# Patient Record
Sex: MALE | Race: ASIAN | NOT HISPANIC OR LATINO | Employment: FULL TIME | URBAN - METROPOLITAN AREA
[De-identification: names, ages, dates, MRNs, and addresses within clinical notes are randomized per-mention and may not be internally consistent; named-entity substitution may affect disease eponyms.]

---

## 2024-07-05 ENCOUNTER — HOSPITAL ENCOUNTER (EMERGENCY)
Facility: HOSPITAL | Age: 29
Discharge: HOME | End: 2024-07-05
Attending: EMERGENCY MEDICINE | Admitting: EMERGENCY MEDICINE
Payer: OTHER MISCELLANEOUS

## 2024-07-05 VITALS
RESPIRATION RATE: 20 BRPM | DIASTOLIC BLOOD PRESSURE: 83 MMHG | OXYGEN SATURATION: 97 % | TEMPERATURE: 98 F | SYSTOLIC BLOOD PRESSURE: 133 MMHG | BODY MASS INDEX: 24.8 KG/M2 | HEIGHT: 67 IN | HEART RATE: 67 BPM | WEIGHT: 158 LBS

## 2024-07-05 DIAGNOSIS — S61.219A FINGER LACERATION: Primary | ICD-10-CM

## 2024-07-05 PROCEDURE — 99284 EMERGENCY DEPT VISIT MOD MDM: CPT | Performed by: EMERGENCY MEDICINE

## 2024-07-05 PROCEDURE — 12001 RPR S/N/AX/GEN/TRNK 2.5CM/<: CPT | Performed by: EMERGENCY MEDICINE

## 2024-07-05 RX ORDER — LIDOCAINE HYDROCHLORIDE 10 MG/ML
10 INJECTION, SOLUTION EPIDURAL; INFILTRATION; INTRACAUDAL; PERINEURAL ONCE
Status: COMPLETED | OUTPATIENT
Start: 2024-07-05 | End: 2024-07-05

## 2024-07-05 RX ADMIN — LIDOCAINE HYDROCHLORIDE 10 ML: 10 INJECTION, SOLUTION EPIDURAL; INFILTRATION; INTRACAUDAL; PERINEURAL at 15:05

## 2024-07-05 NOTE — ED PROVIDER NOTES
History  Chief Complaint   Patient presents with    Finger Injury     Per pt he was at work and he cut his middle finger on right hand.      29-year-old male presents emergency department today for evaluation of cut he sustained at work.  Patient was cutting a piece of sushi with a  knife when the knife slipped and went into his right middle finger, cutting him.  He noted immediate bleeding but washed out thoroughly and wrapped it with a bandage and came to the hospital for an evaluation.  He tells him that he can still feel and move the finger with no problems.        None       History reviewed. No pertinent past medical history.    History reviewed. No pertinent surgical history.    History reviewed. No pertinent family history.  I have reviewed and agree with the history as documented.    E-Cigarette/Vaping    E-Cigarette Use Never User      E-Cigarette/Vaping Substances    Nicotine No     THC No     CBD No     Flavoring No     Unknown No      Social History     Tobacco Use    Smoking status: Every Day     Types: Cigarettes    Smokeless tobacco: Never   Vaping Use    Vaping status: Never Used   Substance Use Topics    Alcohol use: Never    Drug use: Never        Review of Systems   Constitutional:  Negative for chills, fatigue and fever.   Respiratory:  Negative for shortness of breath.    Cardiovascular:  Negative for chest pain.   Gastrointestinal:  Negative for abdominal pain, nausea and vomiting.   Musculoskeletal:  Negative for back pain and neck pain.   Neurological:  Negative for dizziness, weakness, light-headedness, numbness and headaches.   Psychiatric/Behavioral:  Negative for agitation and confusion.        Physical Exam  ED Triage Vitals [07/05/24 1339]   Temperature Pulse Respirations Blood Pressure SpO2   98 °F (36.7 °C) 67 20 133/83 97 %      Temp Source Heart Rate Source Patient Position - Orthostatic VS BP Location FiO2 (%)   Temporal Monitor Sitting Left arm --      Pain Score       6              Orthostatic Vital Signs  Vitals:    07/05/24 1339   BP: 133/83   Pulse: 67   Patient Position - Orthostatic VS: Sitting       Physical Exam  Constitutional:       General: He is not in acute distress.     Appearance: Normal appearance.   HENT:      Head: Normocephalic and atraumatic.   Cardiovascular:      Rate and Rhythm: Normal rate and regular rhythm.      Pulses: Normal pulses.      Heart sounds: Normal heart sounds. No murmur heard.  Pulmonary:      Effort: Pulmonary effort is normal. No respiratory distress.      Breath sounds: Normal breath sounds.   Musculoskeletal:         General: Normal range of motion.      Comments: Full range of motion with 5 out of 5 flexion extension of the lacerated digit: Right middle finger.  All fingers have symmetric strength both on extension and flexion.   Skin:     General: Skin is warm and dry.      Capillary Refill: Capillary refill takes less than 2 seconds.      Findings: Lesion present.      Comments: Laceration to the volar surface of the distal right middle finger measuring approximately 2 cm.  No foreign bodies visualized.  No bone or tendon visualized   Neurological:      Mental Status: He is alert and oriented to person, place, and time.         ED Medications  Medications   lidocaine (PF) (XYLOCAINE-MPF) 1 % injection 10 mL (10 mL Infiltration Given 7/5/24 1505)       Diagnostic Studies  Results Reviewed       None                   No orders to display         Procedures  Universal Protocol:  Consent: Verbal consent obtained.  Consent given by: patient  Site marked: the operative site was marked  Required items: required blood products, implants, devices, and special equipment available  Patient identity confirmed: verbally with patient  Laceration repair    Date/Time: 7/5/2024 2:59 PM    Performed by: Buck Garner MD  Authorized by: Buck Garner MD  Body area: upper extremity  Location details: right long finger  Laceration length: 2 cm  Foreign bodies:  no foreign bodies  Tendon involvement: none  Nerve involvement: none  Vascular damage: no  Anesthesia: digital block    Anesthesia:  Local Anesthetic: lidocaine 1% without epinephrine  Anesthetic total: 3 mL    Sedation:  Patient sedated: no      Wound Dehiscence:  Superficial Wound Dehiscence: simple closure      Procedure Details:  Irrigation solution: tap water  Amount of cleaning: standard  Debridement: none  Degree of undermining: none  Skin closure: 5-0 nylon  Number of sutures: 4  Technique: simple  Approximation: close  Approximation difficulty: simple  Dressing: 4x4 sterile gauze            ED Course                             SBIRT 20yo+      Flowsheet Row Most Recent Value   Initial Alcohol Screen: US AUDIT-C     1. How often do you have a drink containing alcohol? 0 Filed at: 07/05/2024 1342   Audit-C Score 0 Filed at: 07/05/2024 1342   AZALEA: How many times in the past year have you...    Used an illegal drug or used a prescription medication for non-medical reasons? Never Filed at: 07/05/2024 1342                  Medical Decision Making  29-year-old male presents to the emergency department today for evaluation of laceration to right middle finger.  No foreign bodies visualized.  Physical examination largely reassuring with great strength on flexion extension.  No bone or tendon exposed.  Digital block performed and sutures were placed around the wound achieving hemostasis.  Patient instructed to have the sutures removed in 7 days.  All questions answered and patient remained stable for discharge home with clear return precautions.    Risk  Prescription drug management.          Disposition  Final diagnoses:   Finger laceration     Time reflects when diagnosis was documented in both MDM as applicable and the Disposition within this note       Time User Action Codes Description Comment    7/5/2024  3:27 PM Buck Garner Add [S61.219A] Finger laceration           ED Disposition       ED Disposition    Discharge    Condition   Stable    Date/Time   Fri Jul 5, 2024 1527    Comment   Ever Gunn discharge to home/self care.                   Follow-up Information       Follow up With Specialties Details Why Contact Info    [facility]  Schedule an appointment as soon as possible for a visit in 1 week For suture removal [address] [facility] [id number] [phone]            Patient's Medications    No medications on file     No discharge procedures on file.    PDMP Review       None             ED Provider  Attending physically available and evaluated Ever Gunn. I managed the patient along with the ED Attending.    Electronically Signed by           Buck Garner MD  07/05/24 1526

## 2024-07-05 NOTE — ED ATTENDING ATTESTATION
7/5/2024  I, Bala Sy DO, saw and evaluated the patient. I have discussed the patient with the resident/non-physician practitioner and agree with the resident's/non-physician practitioner's findings, Plan of Care, and MDM as documented in the resident's/non-physician practitioner's note, except where noted. All available labs and Radiology studies were reviewed.  I was present for key portions of any procedure(s) performed by the resident/non-physician practitioner and I was immediately available to provide assistance.       At this point I agree with the current assessment done in the Emergency Department.  I have conducted an independent evaluation of this patient a history and physical is as follows:    Patient is a 29-year-old right-hand-dominant male, accompanied by a coworker.  Earlier today at work while bearing food he cut his right middle finger with a clean knife.  Washed it out.  No numbness, no tingling, no weakness.  Unsure when his last tetanus booster was.  Did receive the primary tetanus immunizations.    General:  Patient is well-appearing  Head:  Atraumatic  Eyes:  Conjunctiva pink  ENT:  Mucous membranes are moist  Neck:  Supple  Extremities: On the phalanx of the patient's right middle finger is a 2 cm long by 5 mm deep clean incision, no drainage or discharge, no visible bone or tendon.  No foreign body.  He can flex and extend well at the MCP, PIP and DIP joint.  Normal sensation to the entire finger.  No other injuries to the right hand.  Normal capillary refill.  Neurologic:  Awake, fluent speech, normal comprehension, AAOx3  Skin:  Pink warm and dry  Psychiatric:  Alert, pleasant, cooperative      ED Course     Patient presents with finger incision, no sign of tendon injury, no neurovascular injury.  Wound cleaned and irrigated, laceration repaired by ED resident Dr. Mejia. Supportive care, importance of follow-up and return precautions were discussed with the patient, who expressed  understanding.      Critical Care Time  Procedures